# Patient Record
Sex: FEMALE | Race: WHITE
[De-identification: names, ages, dates, MRNs, and addresses within clinical notes are randomized per-mention and may not be internally consistent; named-entity substitution may affect disease eponyms.]

---

## 2017-07-14 ENCOUNTER — HOSPITAL ENCOUNTER (OUTPATIENT)
Dept: HOSPITAL 35 - RAD | Age: 82
End: 2017-07-14
Attending: FAMILY MEDICINE
Payer: COMMERCIAL

## 2017-07-14 DIAGNOSIS — Z12.31: Primary | ICD-10-CM

## 2017-11-16 ENCOUNTER — HOSPITAL ENCOUNTER (OUTPATIENT)
Dept: HOSPITAL 35 - CAT | Age: 82
End: 2017-11-16
Attending: ANESTHESIOLOGY
Payer: COMMERCIAL

## 2017-11-16 DIAGNOSIS — M47.896: Primary | ICD-10-CM

## 2017-11-16 DIAGNOSIS — M48.061: ICD-10-CM

## 2017-12-09 ENCOUNTER — HOSPITAL ENCOUNTER (INPATIENT)
Dept: HOSPITAL 35 - ER | Age: 82
LOS: 1 days | Discharge: HOME | DRG: 552 | End: 2017-12-10
Attending: INTERNAL MEDICINE | Admitting: INTERNAL MEDICINE
Payer: COMMERCIAL

## 2017-12-09 VITALS — SYSTOLIC BLOOD PRESSURE: 121 MMHG | DIASTOLIC BLOOD PRESSURE: 54 MMHG

## 2017-12-09 VITALS — SYSTOLIC BLOOD PRESSURE: 122 MMHG | DIASTOLIC BLOOD PRESSURE: 59 MMHG

## 2017-12-09 VITALS — SYSTOLIC BLOOD PRESSURE: 160 MMHG | DIASTOLIC BLOOD PRESSURE: 107 MMHG

## 2017-12-09 VITALS — HEIGHT: 65 IN | WEIGHT: 260 LBS | BODY MASS INDEX: 43.32 KG/M2

## 2017-12-09 DIAGNOSIS — D72.829: ICD-10-CM

## 2017-12-09 DIAGNOSIS — Z88.2: ICD-10-CM

## 2017-12-09 DIAGNOSIS — M43.16: ICD-10-CM
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
## 2017-12-09 DIAGNOSIS — S32.029A: Primary | ICD-10-CM

## 2017-12-09 DIAGNOSIS — E66.9: ICD-10-CM
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
## 2017-12-09 DIAGNOSIS — I48.91: ICD-10-CM
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
## 2017-12-09 LAB
ANION GAP SERPL CALC-SCNC: 6 MMOL/L (ref 7–16)
BASOPHILS NFR BLD AUTO: 0 % (ref 0–2)
BILIRUB UR-MCNC: NEGATIVE MG/DL
BUN SERPL-MCNC: 14 MG/DL (ref 7–18)
CALCIUM SERPL-MCNC: 9.8 MG/DL (ref 8.5–10.1)
CHLORIDE SERPL-SCNC: 106 MMOL/L (ref 98–107)
CO2 SERPL-SCNC: 25 MMOL/L (ref 21–32)
COLOR UR: YELLOW
CREAT SERPL-MCNC: 0.7 MG/DL (ref 0.6–1)
EOSINOPHIL NFR BLD: 0 % (ref 0–3)
ERYTHROCYTE [DISTWIDTH] IN BLOOD BY AUTOMATED COUNT: 14 % (ref 10.5–14.5)
GLUCOSE SERPL-MCNC: 109 MG/DL (ref 74–106)
GRANULOCYTES NFR BLD MANUAL: 80 % (ref 36–66)
HCT VFR BLD CALC: 51.2 % (ref 37–47)
HGB BLD-MCNC: 17.3 GM/DL (ref 12–15)
KETONES UR STRIP-MCNC: (no result) MG/DL
LYMPHOCYTES NFR BLD AUTO: 15 % (ref 24–44)
MANUAL DIFFERENTIAL PERFORMED BLD QL: YES
MCH RBC QN AUTO: 32.2 PG (ref 26–34)
MCHC RBC AUTO-ENTMCNC: 33.7 G/DL (ref 28–37)
MCV RBC: 95.6 FL (ref 80–100)
MONOCYTES NFR BLD: 5 % (ref 1–8)
NEUTROPHILS # BLD: 12.3 THOU/UL (ref 1.4–8.2)
NEUTS BAND NFR BLD: 0 % (ref 0–8)
NITRITE UR QL STRIP: NEGATIVE
PLATELET # BLD: 180 THOU/UL (ref 150–400)
POTASSIUM SERPL-SCNC: 4.6 MMOL/L (ref 3.5–5.1)
RBC # BLD AUTO: 5.36 MIL/UL (ref 4.2–5)
RBC # UR STRIP: NEGATIVE /UL
RBC MORPH BLD: NORMAL
SODIUM SERPL-SCNC: 137 MMOL/L (ref 136–145)
SP GR UR STRIP: 1.02 (ref 1–1.03)
TOTAL CELL COUNT: 100
URINE GLUCOSE-RANDOM*: NEGATIVE
URINE PROTEIN (DIPSTICK): NEGATIVE
UROBILINOGEN UR STRIP-ACNC: 0.2 E.U./DL (ref 0.2–1)
WBC # BLD AUTO: 15.4 THOU/UL (ref 4–11)

## 2017-12-09 PROCEDURE — 10790: CPT

## 2017-12-10 VITALS — SYSTOLIC BLOOD PRESSURE: 128 MMHG | DIASTOLIC BLOOD PRESSURE: 71 MMHG

## 2017-12-10 VITALS — DIASTOLIC BLOOD PRESSURE: 76 MMHG | SYSTOLIC BLOOD PRESSURE: 134 MMHG

## 2017-12-19 ENCOUNTER — HOSPITAL ENCOUNTER (OUTPATIENT)
Dept: HOSPITAL 35 - PAIN | Age: 82
Discharge: HOME | End: 2017-12-19
Attending: ANESTHESIOLOGY
Payer: COMMERCIAL

## 2017-12-19 VITALS — WEIGHT: 260 LBS | HEIGHT: 65 IN | BODY MASS INDEX: 43.32 KG/M2

## 2017-12-19 VITALS — SYSTOLIC BLOOD PRESSURE: 110 MMHG | DIASTOLIC BLOOD PRESSURE: 51 MMHG

## 2017-12-19 DIAGNOSIS — M12.88: ICD-10-CM

## 2017-12-19 DIAGNOSIS — G89.29: ICD-10-CM

## 2017-12-19 DIAGNOSIS — Z79.899: ICD-10-CM

## 2017-12-19 DIAGNOSIS — M48.061: ICD-10-CM

## 2017-12-19 DIAGNOSIS — M47.27: Primary | ICD-10-CM

## 2017-12-19 DIAGNOSIS — Z98.890: ICD-10-CM

## 2017-12-19 DIAGNOSIS — Z88.2: ICD-10-CM

## 2017-12-19 DIAGNOSIS — Z79.891: ICD-10-CM

## 2018-01-29 ENCOUNTER — HOSPITAL ENCOUNTER (OUTPATIENT)
Dept: HOSPITAL 35 - GI | Age: 83
Discharge: HOME | End: 2018-01-29
Attending: SPECIALIST
Payer: COMMERCIAL

## 2018-01-29 VITALS — WEIGHT: 255.01 LBS | BODY MASS INDEX: 42.49 KG/M2 | HEIGHT: 65 IN

## 2018-01-29 DIAGNOSIS — D12.3: ICD-10-CM

## 2018-01-29 DIAGNOSIS — Z86.010: ICD-10-CM

## 2018-01-29 DIAGNOSIS — K57.30: ICD-10-CM

## 2018-01-29 DIAGNOSIS — K64.8: ICD-10-CM

## 2018-01-29 DIAGNOSIS — D12.0: Primary | ICD-10-CM

## 2018-01-29 DIAGNOSIS — G47.33: ICD-10-CM

## 2018-01-29 DIAGNOSIS — Z79.899: ICD-10-CM

## 2018-01-29 DIAGNOSIS — Z95.0: ICD-10-CM

## 2018-01-29 DIAGNOSIS — Z90.49: ICD-10-CM

## 2018-01-29 DIAGNOSIS — Z88.2: ICD-10-CM

## 2018-01-29 PROCEDURE — 62900: CPT

## 2018-01-29 PROCEDURE — 62110: CPT

## 2018-05-31 ENCOUNTER — HOSPITAL ENCOUNTER (EMERGENCY)
Dept: HOSPITAL 35 - ER | Age: 83
Discharge: HOME | End: 2018-05-31
Payer: COMMERCIAL

## 2018-05-31 VITALS — SYSTOLIC BLOOD PRESSURE: 127 MMHG | DIASTOLIC BLOOD PRESSURE: 66 MMHG

## 2018-05-31 VITALS — HEIGHT: 66 IN | WEIGHT: 255.01 LBS | BODY MASS INDEX: 40.98 KG/M2

## 2018-05-31 DIAGNOSIS — G47.30: ICD-10-CM

## 2018-05-31 DIAGNOSIS — Z95.0: ICD-10-CM

## 2018-05-31 DIAGNOSIS — S41.112A: ICD-10-CM

## 2018-05-31 DIAGNOSIS — Z90.49: ICD-10-CM

## 2018-05-31 DIAGNOSIS — I48.91: ICD-10-CM
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
## 2018-05-31 DIAGNOSIS — Z88.2: ICD-10-CM

## 2018-05-31 DIAGNOSIS — S43.005A: Primary | ICD-10-CM
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
## 2018-05-31 LAB
ANION GAP SERPL CALC-SCNC: 7 MMOL/L (ref 7–16)
BASOPHILS NFR BLD AUTO: 0.4 % (ref 0–2)
BUN SERPL-MCNC: 13 MG/DL (ref 7–18)
CALCIUM SERPL-MCNC: 9.8 MG/DL (ref 8.5–10.1)
CHLORIDE SERPL-SCNC: 105 MMOL/L (ref 98–107)
CO2 SERPL-SCNC: 29 MMOL/L (ref 21–32)
CREAT SERPL-MCNC: 0.8 MG/DL (ref 0.6–1)
EOSINOPHIL NFR BLD: 0.3 % (ref 0–3)
ERYTHROCYTE [DISTWIDTH] IN BLOOD BY AUTOMATED COUNT: 13.3 % (ref 10.5–14.5)
GLUCOSE SERPL-MCNC: 138 MG/DL (ref 74–106)
GRANULOCYTES NFR BLD MANUAL: 86.9 % (ref 36–66)
HCT VFR BLD CALC: 45.8 % (ref 37–47)
HGB BLD-MCNC: 15.5 GM/DL (ref 12–15)
LYMPHOCYTES NFR BLD AUTO: 9 % (ref 24–44)
MCH RBC QN AUTO: 32.1 PG (ref 26–34)
MCHC RBC AUTO-ENTMCNC: 33.8 G/DL (ref 28–37)
MCV RBC: 95.1 FL (ref 80–100)
MONOCYTES NFR BLD: 3.4 % (ref 1–8)
NEUTROPHILS # BLD: 10.4 THOU/UL (ref 1.4–8.2)
PLATELET # BLD: 166 THOU/UL (ref 150–400)
POTASSIUM SERPL-SCNC: 4.1 MMOL/L (ref 3.5–5.1)
RBC # BLD AUTO: 4.81 MIL/UL (ref 4.2–5)
SODIUM SERPL-SCNC: 141 MMOL/L (ref 136–145)
WBC # BLD AUTO: 12 THOU/UL (ref 4–11)

## 2018-05-31 PROCEDURE — 62900: CPT

## 2018-05-31 PROCEDURE — 62110: CPT

## 2018-07-09 ENCOUNTER — HOSPITAL ENCOUNTER (OUTPATIENT)
Dept: HOSPITAL 35 - RAD | Age: 83
Discharge: HOME | End: 2018-07-09
Attending: ORTHOPAEDIC SURGERY
Payer: COMMERCIAL

## 2018-07-09 DIAGNOSIS — M19.012: Primary | ICD-10-CM

## 2018-07-09 DIAGNOSIS — Z79.899: ICD-10-CM

## 2018-07-09 DIAGNOSIS — M25.512: ICD-10-CM

## 2018-07-09 DIAGNOSIS — Z88.2: ICD-10-CM

## 2018-07-09 DIAGNOSIS — Z98.890: ICD-10-CM

## 2018-07-18 ENCOUNTER — HOSPITAL ENCOUNTER (OUTPATIENT)
Dept: HOSPITAL 35 - RAD | Age: 83
End: 2018-07-18
Attending: FAMILY MEDICINE
Payer: COMMERCIAL

## 2018-07-18 DIAGNOSIS — Z12.31: Primary | ICD-10-CM

## 2019-04-04 ENCOUNTER — HOSPITAL ENCOUNTER (OUTPATIENT)
Dept: HOSPITAL 35 - CV | Age: 84
End: 2019-04-04
Attending: INTERNAL MEDICINE
Payer: COMMERCIAL

## 2019-04-04 DIAGNOSIS — I48.91: ICD-10-CM

## 2019-04-04 DIAGNOSIS — I08.1: Primary | ICD-10-CM

## 2019-04-04 DIAGNOSIS — Z95.0: ICD-10-CM

## 2019-04-04 DIAGNOSIS — I45.9: ICD-10-CM

## 2019-07-24 ENCOUNTER — HOSPITAL ENCOUNTER (OUTPATIENT)
Dept: HOSPITAL 35 - RAD | Age: 84
End: 2019-07-24
Attending: FAMILY MEDICINE
Payer: COMMERCIAL

## 2019-07-24 DIAGNOSIS — Z12.31: Primary | ICD-10-CM

## 2019-09-30 ENCOUNTER — HOSPITAL ENCOUNTER (EMERGENCY)
Dept: HOSPITAL 35 - ER | Age: 84
Discharge: HOME | End: 2019-09-30
Payer: COMMERCIAL

## 2019-09-30 VITALS — BODY MASS INDEX: 39.05 KG/M2 | HEIGHT: 66 IN | WEIGHT: 242.99 LBS

## 2019-09-30 VITALS — SYSTOLIC BLOOD PRESSURE: 112 MMHG | DIASTOLIC BLOOD PRESSURE: 61 MMHG
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
## 2019-09-30 DIAGNOSIS — G47.30: ICD-10-CM

## 2019-09-30 DIAGNOSIS — Z95.0: ICD-10-CM

## 2019-09-30 DIAGNOSIS — S30.0XXA: ICD-10-CM

## 2019-09-30 DIAGNOSIS — S80.812A: ICD-10-CM
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
## 2019-09-30 DIAGNOSIS — Z88.2: ICD-10-CM

## 2019-09-30 DIAGNOSIS — S50.01XA: Primary | ICD-10-CM

## 2019-09-30 DIAGNOSIS — S00.03XA: ICD-10-CM

## 2019-09-30 DIAGNOSIS — I48.91: ICD-10-CM
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
## 2019-09-30 DIAGNOSIS — Z90.49: ICD-10-CM

## 2020-02-25 ENCOUNTER — HOSPITAL ENCOUNTER (OUTPATIENT)
Dept: HOSPITAL 35 - CAT | Age: 85
End: 2020-02-25
Attending: OTOLARYNGOLOGY
Payer: COMMERCIAL

## 2020-02-25 DIAGNOSIS — J34.89: Primary | ICD-10-CM

## 2020-02-25 DIAGNOSIS — J32.9: ICD-10-CM

## 2020-06-07 ENCOUNTER — HOSPITAL ENCOUNTER (INPATIENT)
Dept: HOSPITAL 35 - ER | Age: 85
LOS: 3 days | Discharge: HOME HEALTH | DRG: 552 | End: 2020-06-10
Payer: COMMERCIAL

## 2020-06-07 VITALS — WEIGHT: 240 LBS | HEIGHT: 65.98 IN | BODY MASS INDEX: 38.57 KG/M2

## 2020-06-07 VITALS — DIASTOLIC BLOOD PRESSURE: 50 MMHG | SYSTOLIC BLOOD PRESSURE: 131 MMHG

## 2020-06-07 VITALS — SYSTOLIC BLOOD PRESSURE: 131 MMHG | DIASTOLIC BLOOD PRESSURE: 50 MMHG

## 2020-06-07 VITALS — SYSTOLIC BLOOD PRESSURE: 120 MMHG | DIASTOLIC BLOOD PRESSURE: 46 MMHG

## 2020-06-07 VITALS — DIASTOLIC BLOOD PRESSURE: 56 MMHG | SYSTOLIC BLOOD PRESSURE: 106 MMHG
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
## 2020-06-07 DIAGNOSIS — I48.91: ICD-10-CM

## 2020-06-07 DIAGNOSIS — Z79.01: ICD-10-CM
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
## 2020-06-07 DIAGNOSIS — E03.9: ICD-10-CM

## 2020-06-07 DIAGNOSIS — M48.061: ICD-10-CM
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
## 2020-06-07 DIAGNOSIS — Z88.2: ICD-10-CM

## 2020-06-07 DIAGNOSIS — Z95.0: ICD-10-CM

## 2020-06-07 DIAGNOSIS — Z79.899: ICD-10-CM

## 2020-06-07 DIAGNOSIS — S22.089A: Primary | ICD-10-CM

## 2020-06-07 DIAGNOSIS — K59.00: ICD-10-CM

## 2020-06-07 DIAGNOSIS — M47.815: ICD-10-CM

## 2020-06-07 DIAGNOSIS — G62.9: ICD-10-CM

## 2020-06-07 DIAGNOSIS — Z90.49: ICD-10-CM

## 2020-06-07 DIAGNOSIS — G47.33: ICD-10-CM

## 2020-06-07 LAB
ANION GAP SERPL CALC-SCNC: 7 MMOL/L (ref 7–16)
APTT BLD: 44.3 SECONDS (ref 24.5–32.8)
BUN SERPL-MCNC: 15 MG/DL (ref 7–18)
CALCIUM SERPL-MCNC: 9.8 MG/DL (ref 8.5–10.1)
CHLORIDE SERPL-SCNC: 99 MMOL/L (ref 98–107)
CO2 SERPL-SCNC: 31 MMOL/L (ref 21–32)
CREAT SERPL-MCNC: 0.9 MG/DL (ref 0.6–1)
ERYTHROCYTE [DISTWIDTH] IN BLOOD BY AUTOMATED COUNT: 13.2 % (ref 10.5–14.5)
GLUCOSE SERPL-MCNC: 119 MG/DL (ref 74–106)
HCT VFR BLD CALC: 45.3 % (ref 37–47)
HGB BLD-MCNC: 15.6 GM/DL (ref 12–15)
INR PPP: 1.2
MCH RBC QN AUTO: 32.8 PG (ref 26–34)
MCHC RBC AUTO-ENTMCNC: 34.4 G/DL (ref 28–37)
MCV RBC: 95.3 FL (ref 80–100)
PLATELET # BLD: 196 THOU/UL (ref 150–400)
POTASSIUM SERPL-SCNC: 4.5 MMOL/L (ref 3.5–5.1)
PROTHROMBIN TIME: 11.9 SECONDS (ref 9.3–11.4)
RBC # BLD AUTO: 4.76 MIL/UL (ref 4.2–5)
SODIUM SERPL-SCNC: 137 MMOL/L (ref 136–145)
WBC # BLD AUTO: 10.7 THOU/UL (ref 4–11)

## 2020-06-07 PROCEDURE — 5A09357 ASSISTANCE WITH RESPIRATORY VENTILATION, LESS THAN 24 CONSECUTIVE HOURS, CONTINUOUS POSITIVE AIRWAY PRESSURE: ICD-10-PCS

## 2020-06-07 PROCEDURE — 10195: CPT

## 2020-06-07 NOTE — NUR
PT CARE ASSUMED AT 0700. A&OX4. PT ON BEDREST PER MD ORDERS DUE TO T12
FRACTURE. PT IS TO STAY ON BEDREST UNTIL BACKBRACE IS ON AFTER HEELROOM COMES
TO HER AND MEASURES HER. PAIN IS BEING MEASURED WITH PAIN MEDICATION. PT CAN
HAVE THE HOB ELEVATED TO 20%. EXTERNAL CLEMENTE IN PLACE. CONSULTS CALLED. PT
WEARS HEARING AIDS AND DENTURES. PACEMAKER. XRAYS COMPLEETED AWAITING RESULTS.
WEARS CPAP AT HOME. FALL PROTOCOL IN PLACE. ADMISSION COMPLEETED. CALL LIGHT
IN REACH.

## 2020-06-08 VITALS — SYSTOLIC BLOOD PRESSURE: 109 MMHG | DIASTOLIC BLOOD PRESSURE: 62 MMHG

## 2020-06-08 VITALS — DIASTOLIC BLOOD PRESSURE: 59 MMHG | SYSTOLIC BLOOD PRESSURE: 124 MMHG

## 2020-06-08 VITALS — DIASTOLIC BLOOD PRESSURE: 66 MMHG | SYSTOLIC BLOOD PRESSURE: 118 MMHG

## 2020-06-08 VITALS — DIASTOLIC BLOOD PRESSURE: 52 MMHG | SYSTOLIC BLOOD PRESSURE: 129 MMHG

## 2020-06-08 PROCEDURE — 5A09357 ASSISTANCE WITH RESPIRATORY VENTILATION, LESS THAN 24 CONSECUTIVE HOURS, CONTINUOUS POSITIVE AIRWAY PRESSURE: ICD-10-PCS

## 2020-06-08 NOTE — NUR
ASSUMED CARE OF THE PT AT 0700. PT IS ON STRUCT BEDREST AND CANNOT ELEVATE HOB
>20 DEGREES. BACK BRACE DELIVERED. L FOREARM DRY AND INTACT. EXTERNAL CATHETER
IN PLACE, PT IS INCONTINENT. PT USES CPAP AT NITE WITH PULSE OX. PT HAS
PACEMAKER. FALL PRECAUTIONS IN PLACE, BED IN THE LOWEST POSITION AND BED/CHAIR
ALARM ON, CALL LIGHT IS WITHIN REACH. WILL CONTINUE TO MONITOR THE PT.

## 2020-06-08 NOTE — NUR
ASSUMED PT CARE AT 1900. PT RESTING IN BED. RT BROUGHT CPAP AND HOOKED HER UP.
NO C/O PAIN OVERNIGHT - SAYS IT IS ONLY WHEN SHE MOVES. ABRASIONS ON ELBOWS
GIVEN A FRESH BANDAID. FEMALE EXTERNAL CATHETER IN PLACE. NO COMPLAINTS AT
THIS TIME. WILL CONTINUE TO MONITOR.

## 2020-06-09 VITALS — SYSTOLIC BLOOD PRESSURE: 140 MMHG | DIASTOLIC BLOOD PRESSURE: 62 MMHG

## 2020-06-09 VITALS — DIASTOLIC BLOOD PRESSURE: 59 MMHG | SYSTOLIC BLOOD PRESSURE: 155 MMHG

## 2020-06-09 VITALS — SYSTOLIC BLOOD PRESSURE: 146 MMHG | DIASTOLIC BLOOD PRESSURE: 74 MMHG

## 2020-06-09 VITALS — SYSTOLIC BLOOD PRESSURE: 126 MMHG | DIASTOLIC BLOOD PRESSURE: 64 MMHG

## 2020-06-09 NOTE — NUR
ASSESSMENT: CM REVIEWED CHART AND ATTEMPTED TO CALL PT AT BEDSIDE BUT NO
ANSWER. CM REACHED OUT TO PATIENTS DAUGHTER/DPOA RANDAL WHO STATED THAT PATIENT
LIVES AT HOME ALONE. SHE REPORTS THAT HER NIECE AIDE IS FLYING IN FROM
[facility] TOMORROW TO HELP ASSIST PATIENT AT DISCHARGE IF NEEDED. SHE REPORTS
THAT PATIENTS HAS TWO STEPS TO enter the home NO STEPS TO GET TO HER BEDROOM.
PT NORMALLY AMBULATES INDEPENDENTLY BUT DOES HAVE A CANE SHE USES WHEN OUTSIDE
OF THE HOME. PT HAS A GRAB BAR IN THE SHOWER. PT HAS NOT BEEN TO SNF. CM
DISCUSSED ROLE. PT HAS HER BRACE AND WORKING WITH PT/OT. CONSULT WAS PLACED
FOR [facility] AND CM IS AWAITING INPUT FROM [facility] AS BED AVAILABILTY IS TIGHT. SHE
STATES SHE WILL UPDATE CM AS INFORMATION IS AVAILABLE. DAUGHTER STATED HER
SISTER IS NERVOUS ABOUT PT CONSIDERING A SNF DUE TO THE COVID 19 VIRUS. CM
DISCUSSED HH OPTIONS AS WELL IF THEY ARE REFUSING SNF. CM WILL AWAIT INPUT
FROM [facility] AND CONTINUE TO FOLLOW TO ASSIST AS NEEDED.

## 2020-06-09 NOTE — NUR
PT CARE ASSUMED AT 0700. A&Ox4. PT UP IN THE RECLINER NOW THAT SHE HAS HER
BRACE. WHEN SHE IS NOT WEARING IT SHE NEEDS TO LAY FLAT IN BED WITH NO MORE
THEN A 20 DEGREE HOB ELEVATION. EXTERNAL CLEMENTE IN PLACE. CPAP AT NIGHT. PT
WILL EITHER GO TO REHAB OR HOME WITH HOME HEALTH. IV IS PATENT WITH NO REDNESS
OR EDEMA, SALINE LOCKED. PT IS STRUGGLING TO HAVE A BM AND IS REQUESTING A
LAXATIVE. PT NOW HAS PO PAIN MEDICATION AND HAS STATED THAT SHE IS DOING MUCH
BETTER HAVING A BRACE ON. FAMILY HAS BEEN UPDATED. CALL LIGHT IN REACH. FALL
PROTOCOL IN PLACE. WILL CONTINUE TO MONITOR.

## 2020-06-09 NOTE — NUR
ASSESSED AT START OF SHIFT 1900, PT RESTING IN BED HOB 20 DEGREES ON A STRICT
BED REST. ORTHO BACK BRACE AT BEDSIDE PHYSICAL THERAPY TO EVALUATE TOMORROW.
C/O PAIN 8/10 MANAGED WITH IV MORPHINE. EXT FEMALE CATH IN PLACE AND PT WEARS
CPAP AT NIGHT.  PT GRAND DTR AIDE CALLED WORRIED THAT NO ONE HAS UPDATED
HER ON CARE AND STATED THAT  HASN'T SEE HER. UPDATED HER ON CARE AND
INFORMED ABOUT PROGRESS AND CARE GIVEN DURING THE DAY. FALL PREC IN PLACE AND
CALL LIGHT IN REACH PT SLEPT THE REST OF THE NIGHT WILL CONT WITH POC TILL
EOS.

## 2020-06-10 VITALS — DIASTOLIC BLOOD PRESSURE: 63 MMHG | SYSTOLIC BLOOD PRESSURE: 114 MMHG

## 2020-06-10 VITALS — SYSTOLIC BLOOD PRESSURE: 114 MMHG | DIASTOLIC BLOOD PRESSURE: 63 MMHG

## 2020-06-10 VITALS — SYSTOLIC BLOOD PRESSURE: 121 MMHG | DIASTOLIC BLOOD PRESSURE: 47 MMHG

## 2020-06-10 NOTE — NUR
PT CARE ASSUMED AT 0700. A&0x4. PT UP IN THE RECLINER WITH BACKBRACE ON.
EXTERNAL CLEMENTE IN PLACE. CPAP OVER NIGHT. BM TODAY. PT WILL DISCHARGE HOME
WITH HOMEHEALTH TODAY. DISCHARGE INSTRUCTIONS GIVEN AND PT UNDERSTANDS. UP
WITH WALKER. IV PATENT WITH NO REDNESS OR EDEMA. IV REMOVED. FALL PROTOCOL IN
PLACE.

## 2020-06-10 NOTE — NUR
FAXED DC ORDERS/SUMMARY TO [facility] HH SPOKE WITH PREETHI THEY RECEIVED
ORDERS AND WILL NOTIFY PT TIME OF VISITS ALSO NEEDED TO KNOW PCP NOTIFIED PREETHI
THAT IT IS DR. TIMI MONTALVO [phone].

## 2020-06-10 NOTE — NUR
ASSUMED PT CARE AT 1900. PT C/O PAIN 7/10 TONIGHT. PAIN MEDICATION GIVEN WITH
LITTLE RELIEF. UP TO TOILET WITH ASSISTANCE AND WALKER. BM TONIGHT. C/O BACK
BRACE BEING OUT OF PLACE. READJUSTED MULTIPLE TIMES UNTIL PT FINALLY GOT
COMFORTABLE. CURRENTLY RESTING IN BED WITH CPAP ON.

## 2020-06-10 NOTE — NUR
on-going assessment: CM RECEIVED A CALL FROM PHYSICAL THERAPY STATING PT DID
VERY WELL TODAY AND HE IS NOW RECOMMENDING HOME WITH HH. CM SPOKE WITH PT WHO
STATES SHE DOES NOT HAVE A PREFERENCE OF HH COMPANY. [facility] AND WAITING ON A CALL BACK. CM ALSO LEFT  FOR HER DAUGHTER
RANDAL. PATIENTS HAS A NIECE WHO IS FLYING INTO TOWN TODAY AND WILL BE ABLE TO
STAY WITH HER AT DISCHARGE.

## 2020-06-25 ENCOUNTER — HOSPITAL ENCOUNTER (OUTPATIENT)
Dept: HOSPITAL 35 - RAD | Age: 85
End: 2020-06-25
Attending: NEUROLOGICAL SURGERY
Payer: COMMERCIAL
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
## 2020-06-25 DIAGNOSIS — S22.080A: Primary | ICD-10-CM

## 2020-06-25 DIAGNOSIS — S32.029A: ICD-10-CM
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
## 2020-06-30 ENCOUNTER — HOSPITAL ENCOUNTER (OUTPATIENT)
Dept: HOSPITAL 35 - SJCVC | Age: 85
End: 2020-06-30
Attending: INTERNAL MEDICINE
Payer: COMMERCIAL

## 2020-06-30 DIAGNOSIS — R94.31: Primary | ICD-10-CM

## 2020-06-30 DIAGNOSIS — Z95.0: ICD-10-CM

## 2020-06-30 DIAGNOSIS — I48.21: ICD-10-CM

## 2020-06-30 DIAGNOSIS — I65.9: ICD-10-CM

## 2020-07-01 ENCOUNTER — HOSPITAL ENCOUNTER (OUTPATIENT)
Dept: HOSPITAL 35 - SJCVCIMAG | Age: 85
End: 2020-07-01
Attending: INTERNAL MEDICINE
Payer: COMMERCIAL

## 2020-07-01 DIAGNOSIS — R22.43: ICD-10-CM

## 2020-07-01 DIAGNOSIS — M79.605: ICD-10-CM

## 2020-07-01 DIAGNOSIS — M79.604: Primary | ICD-10-CM

## 2020-09-03 ENCOUNTER — HOSPITAL ENCOUNTER (OUTPATIENT)
Dept: HOSPITAL 35 - BC | Age: 85
End: 2020-09-03
Attending: FAMILY MEDICINE
Payer: COMMERCIAL

## 2020-09-03 DIAGNOSIS — Z12.31: Primary | ICD-10-CM

## 2020-11-11 ENCOUNTER — HOSPITAL ENCOUNTER (OUTPATIENT)
Dept: HOSPITAL 35 - PAIN | Age: 85
End: 2020-11-11
Attending: ANESTHESIOLOGY
Payer: COMMERCIAL

## 2020-11-11 VITALS — WEIGHT: 230 LBS | HEIGHT: 65.98 IN | BODY MASS INDEX: 36.96 KG/M2

## 2020-11-11 VITALS — SYSTOLIC BLOOD PRESSURE: 113 MMHG | DIASTOLIC BLOOD PRESSURE: 56 MMHG

## 2020-11-11 DIAGNOSIS — Z90.49: ICD-10-CM

## 2020-11-11 DIAGNOSIS — M48.07: ICD-10-CM

## 2020-11-11 DIAGNOSIS — G89.4: ICD-10-CM

## 2020-11-11 DIAGNOSIS — I10: ICD-10-CM

## 2020-11-11 DIAGNOSIS — M47.27: ICD-10-CM
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
## 2020-11-11 DIAGNOSIS — Z79.899: ICD-10-CM

## 2020-11-11 DIAGNOSIS — S32.008D: Primary | ICD-10-CM

## 2020-11-11 NOTE — NUR
Pain Clinic Assessment:
 
1. History of Osteoarthritis:
SPINE
   History of Rheumatoid Arthritis:
DENIES
 
2. Height: 5 ft. 6 in. 167.6 cm.
   Weight: 230.0 lb.  oz. 104.328 kg.
   Patient's BMI: 37.1
 
3. Vital Signs:
   BP: 113/56 Pulse: 62 Resp: 20
   Temp:  02 Sat: 100 ECG Mon:
 
4. Pain Intensity: 9
 
5. Fall Risk:
   Dizziness: N  Needs help standing or walking: Y
   Fallen in the last 3 months: Y
   Fall risk comments:
 
 
6. Patient on Blood Thinner: XARELTO
 
7. History of Hypertension: Y
 
8. Opioid Therapy greater than 6 weeks: N
   Opiate Contract Signed:
 
9. Risk Assessment Tool Provided:
 
10. Functional Assessment Tool:
 
11. Recreational Drug Use: Never Drug Type:
    Tobacco Use: Never Smoker Tobacco Type:
       Amount or Packs/day:  How Many Years:
    Alcohol Use: No  Frequency:  Quant:

## 2020-11-11 NOTE — HPC
[facility]
[place]   [address]                     PAIN MANAGEMENT CONSULTATION  
_______________________________________________________________________________
 
Name:       TONJA VALLE              Room #:                     REG [facility].#:      [id number]                       Account #:      [id number]  
Admission:  11/11/20    Attend Phys:    Anthony Abdi DO  
Discharge:              Date of Birth:  [birth year]  
                                                          Report #: [id number]
                                                                    [id number]   
_______________________________________________________________________________
THIS REPORT FOR:  
 
cc:  Tamia Browne MD,Anthony Reynoso MD, DO                                          ~
CC: Anthony Browne
 
DATE OF SERVICE:  11/11/2020
 
 
REFERRING PHYSICIAN:  Wilson Fried MD
 
CHIEF COMPLAINT:  Low back pain.
 
HISTORY OF PRESENT ILLNESS:  As you know, the patient is an 87-year-old female
with longstanding history of low back pain, who was treated with epidural
injections in 2017 successfully undergoing 2 injections with resolution of
symptoms.  The patient states she was doing very well until 06/2020 when she
sustained a fall, leading to compression fracture at T12 and L2.  The patient
was seen and evaluated in the hospital, advised of treatment options and chose
to undergo TLSO bracing.  The patient states she wore the brace no greater than
3 weeks as it was "irritating."  She continues to experience axial back pain
that has progressed.  She has been referred to our clinic to discuss treatment
options for axial back symptoms.  She states that the symptoms she is
experiencing began directly after a fall and has worsened.  The fall was
sustained 06/04/2020.  The patient was referred to our clinic to discuss
interventional treatments.
 
The patient reports today pain is constant and steady.  She describes the pain
as aching and sharp.  She places her daily pain score 9/10, daily average at
9/10, worst pain has been is 10/10.  The patient states that pain is exacerbated
with doing any activities, improves with pain medications.  She has been
referred to our clinic to discuss interventional treatment options.
 
PAST MEDICAL HISTORY:
1.  Vertebral compression fracture of the lumbar spine.
2.  Hypertension.
3.  Thyroid disease.
 
PAST SURGICAL HISTORY:
1.  Cholecystectomy.
2.  Sinus surgery.
 
SOCIAL HISTORY:  The patient denies tobacco, alcohol, IV or illicit drug use. 
 
 
 
[facility]
[address] [place]   [address]                     PAIN MANAGEMENT CONSULTATION  
_______________________________________________________________________________
 
Name:       TONJA VALLE              Room #:                     REG [facility]CUAL.#:      [id number]                       Account #:      [id number]  
Admission:  11/11/20    Attend Phys:    Anthony Abdi DO  
Discharge:              Date of Birth:  [birth year]  
                                                          Report #: [id number]
                                                                    [id number]   
_______________________________________________________________________________
She is a retired teacher and .  She is not working, not receiving
workmen's compensation nor is trying to obtain disability benefits.  She is
unaccompanied at today's visit.
 
REVIEW OF SYSTEMS:  Positive for weight gain, decrease in appetite, frequent
headaches, wearing corrective eyewear, hearing loss with tinnitus, heart
trouble, shortness of breath, obstructive sleep apnea requiring CPAP, changes in
bowel movements, constipation, frequent urination, nocturia, incontinence and
dribbling to urine, rashes and itching, skin color changes, hair and nail
changes, bleeding and bruising tendencies, low back pain.  All other review of
systems negative per 12-point review of systems other than those listed in
history of present illness.
 
Pain impact score 26/70, moderate interference of daily activities secondary to
pain.
 
ALLERGIES:  No reported drug allergies.
 
CURRENT MEDICATIONS:  Acetaminophen 500 mg t.i.d., cyanocobalamin 1000 mcg per
day, vitamin D3 one tab per day, pseudoephedrine 30 mg p.r.n.,
hydrochlorothiazide 25 mg per day, Vyzulta one drop each eye per day.
 
IMAGING:  CT of the thoracic spine obtained 06/07/2020 shows acute transverse
horizontally oriented fracture involving the mid anterior aspect, the superior
plate of T12.  Chronic appearing L2 compression fracture.  Multilevel thoracic
lumbar spondylosis with multiple central canal stenosis, L1 through L4, L5.
 
PQRS:  The patient has known arthritic changes of the lumbar spine, bilateral
hips and knees.  No rheumatoid arthritis.  She is placing pain score at 9/10. 
She is a fall risk, has had a fall in last 3 months, but does not utilize any
type of ambulatory device despite recommendations to do so.  She is on blood
thinners in the form of Xarelto.  She is treated for hypertension.  She is not
on chronic opioids, has a low opiate addiction potential.  Pain impact 26/70,
moderate interference of daily activities secondary to pain.
 
PHYSICAL EXAMINATION:
VITAL SIGNS:  Blood pressure 113/56, pulse 62, respiratory rate 20 and
unlabored.  The patient is 100% on room air.  Height 5 feet 6 inches tall,
weight 230 pounds, BMI calculated 37.1.
GENERAL:  Well-developed, well-nourished, well-hydrated exogenously obese
87-year-old female appearing stated age, pain is rated today 9/10.
HEENT:  Normocephalic, atraumatic.  Pupils equal, round and reactive.
NEUROLOGIC:  Speech fluent.  The patient deemed a good historian.
LUNGS:  Clear.  No wheeze, rhonchi, and no rales.
CARDIOVASCULAR:  Regular.  No appreciable gallop, no rub.
ABDOMEN:  Soft, obese, normoactive bowel sounds.
 
 
 
[address]   [address]                     PAIN MANAGEMENT CONSULTATION  
_______________________________________________________________________________
 
Name:       TONJA VALLE              Room #:                     REG ROLAN PENA#:      [id number]                       Account #:      [id number]  
Admission:  11/11/20    Attend Phys:    Anthony Abdi DO  
Discharge:              Date of Birth:  [birth year]  
                                                          Report #: [id number]
                                                                    [id number]   
_______________________________________________________________________________
EXTREMITIES:  Show no clubbing, no cyanosis, and no appreciable edema.
MUSCULOSKELETAL:  Lower extremity strength appears symmetrical, but
deconditioning noted bilaterally.  Seated straight leg raising negative.  Supine
straight leg raising negative.  Shane's test is negative.  Modified Gaenslen's
positive for axial low back pain.  Ankle clonus negative.  Babinski is negative.
 Palpatory tenderness is noted over the paraspinal musculature of lower lumbar
spine.  No spinous process tenderness.  The patient's gait appears normal for
her.  She does show slight loss of lordotic curvature in stance.
 
ASSESSMENT:
1.  Symptomatic lumbar radiculopathy.
2.  Spinal stenosis of the lumbar spine.
3.  Lumbosacral spondylosis with radiculopathy.
4.  Lumbar degeneration.
5.  History of vertebral compression fractures with suboptimal therapeutic
treatment.
6.  Chronic intractable pain.
 
PLAN:
1.  The patient has been referred to our service by her primary care physician
for evaluation and treatment for chronic axial back pain.  The patient has had a
fall in 06/2020, leading to compression fracture of T12 and a chronic
compression fracture of L2.  The patient opted not to undergo vertebral
augmentation of the T12 while in the hospital and chose to utilize the TLSO
bracing system, which was recommended to be worn for 8 weeks.  The patient
states she did not make it as long as even 3 weeks before she discarded the
device.  There has been no new imaging since that time.  The patient is noted to
have done very well with the epidural injections in the past to address lumbar
radiculopathy secondary to central canal stenosis.  It would be difficult to
rule out any vertebral compression fracture or exacerbation of her known
compression fractures without further imaging and we would recommend this before
moving forward with interventional treatments.  We did discuss today the
interventional treatments we would recommend and the following was discussed
with the patient.
 
We discussed physical therapy, stretching exercise, core strengthening and a
concerted effort at weight loss to address axial back pain issues.  We discussed
suggestions in medication management to include neuropathic pain medications and
the possibility of a low dose opioid as the patient cannot take nonsteroidal
anti-inflammatories in conjunction with Xarelto therapy.  The suggestions would
be provided to the PCP.  We discussed lumbar epidural injection under
fluoroscopic guidance for which the patient was referred to our clinic.  We also
discussed surgical options with the patient, though at this point, I would not
recommend surgical options given her age and underlying comorbidities, prior to
trying more conservative treatment.  The patient is agreeable.  After reviewing
the risks and benefits of all proposed treatment options, she chose to move
 
 
 
[place] [address]                     PAIN MANAGEMENT CONSULTATION  
_______________________________________________________________________________
 
Name:       TONJA VALLE              [facility]                     [id number]                       Account #:      [id number]  
Admission:  11/11/20    Attend Phys:    Anthony Abdi DO  
Discharge:              Date of Birth:  [birth year]  
                                                          Report #: [id number]
                                                                    [id number]   
_______________________________________________________________________________
forward with an epidural injection under fluoroscopic guidance.
2.  The patient will undergo x-ray imaging of the lumbar spine.  I wish to
further evaluate the lumbar region before considering undergoing an epidural
injection.  I am concerned about the T12 fracture and the subtherapeutic
treatment of her compression with the TLSO bracing system.  She was not in the
brace long enough to provide stabilization of the fracture or long enough for
healing.  There is a possibility this may have worsened.  I also wish to
determine whether or not a spontaneous compression fracture has occurred in the
area as literature would indicate that spontaneous fractures could occur easily
within 6 weeks of the fracture.  The fact the patient had a chronic L2
compression fracture that was not related to injury would indicate a spontaneous
compression fracture.  This in conjunction with a compression fracture from a
known injury, places her at 12-fold increase of spontaneous fracture occurring
above or below and this needs to be further evaluated.  She will be sent for
x-ray imaging today to determine if there is any concerning canal impingement by
posterior walls of any new fractures.
3.  The Patient will need to gain clearance to come off her Xarelto for 3 days
prior to an epidural injection.  This is based on ED guidelines.  She will
have to be off the medication for 3 days to undergo the procedure.  We recommend
she contact the prescribing physician, gain clearance to come off the
medication.  If she can gain clearance, we will have the patient return to
undergo the epidural injection requested.
4.  No medication changes made at today's visit except for the request to come
off the Xarelto.  She will continue all current medication therapies at present.
 If she is able to gain the authorization to come off the Xarelto, she will stop
that 3 days before our appointment next Tuesday.
5.  We wish to thank the referring physician for the opportunity to see this
patient in consultation and discuss treatment options for axial back pain
secondary to spinal stenosis.  Again, we wish to thank you for the opportunity
to see this patient in consultation.
 
 
 
 
 
 
 
 
 
 
 
 
 
 
                         
   By:                               
                   
D: 11/11/20 1716                           _____________________________________
T: 11/12/20 0834                           Anthony Abdi DO            /nt

## 2020-11-17 ENCOUNTER — HOSPITAL ENCOUNTER (OUTPATIENT)
Dept: HOSPITAL 35 - PAIN | Age: 85
Discharge: HOME | End: 2020-11-17
Attending: ANESTHESIOLOGY
Payer: COMMERCIAL

## 2020-11-17 VITALS — DIASTOLIC BLOOD PRESSURE: 66 MMHG | SYSTOLIC BLOOD PRESSURE: 133 MMHG

## 2020-11-17 VITALS — HEIGHT: 65.98 IN | BODY MASS INDEX: 38.02 KG/M2 | WEIGHT: 236.6 LBS

## 2020-11-17 DIAGNOSIS — M19.90: ICD-10-CM

## 2020-11-17 DIAGNOSIS — M48.061: ICD-10-CM

## 2020-11-17 DIAGNOSIS — M51.16: Primary | ICD-10-CM

## 2020-11-17 DIAGNOSIS — Z98.890: ICD-10-CM

## 2020-11-17 DIAGNOSIS — I10: ICD-10-CM

## 2020-11-17 DIAGNOSIS — Z79.899: ICD-10-CM

## 2020-11-17 DIAGNOSIS — M47.27: ICD-10-CM

## 2020-11-17 DIAGNOSIS — G89.29: ICD-10-CM

## 2020-11-17 NOTE — NUR
Pain Clinic Assessment:
 
1. History of Osteoarthritis:
SPINE
POSSIBLE LEFT SHOULDER
   History of Rheumatoid Arthritis:
DENIES
 
2. Height: 5 ft. 6 in. 167.6 cm.
   Weight: 236.6 lb.  oz. 107.321 kg.
   Patient's BMI: 38.2
 
3. Vital Signs:
   BP: 133/66 Pulse: 80 Resp: 18
   Temp:  02 Sat: 99 ECG Mon:
 
4. Pain Intensity: 8-9
 
5. Fall Risk:
   Dizziness: N  Needs help standing or walking: N
   Fallen in the last 3 months: N
   Fall risk comments:
 
 
6. Patient on Blood Thinner: XARELTO
 
7. History of Hypertension: Y
 
8. Opioid Therapy greater than 6 weeks: N
   Opiate Contract Signed:
 
9. Risk Assessment Tool Provided: 0-LOW RISK
 
10. Functional Assessment Tool: 26/70
 
11. Recreational Drug Use: Never Drug Type:
    Tobacco Use: Never Smoker Tobacco Type:
       Amount or Packs/day:  How Many Years:
    Alcohol Use: No  Frequency:  Quant:

## 2020-11-18 NOTE — HPC
[facility]
[place]   [address]                     PAIN MANAGEMENT CONSULTATION  
_______________________________________________________________________________
 
Name:       TONJA VALLE              Room #:                     REG [facility]..#:      [id number]                       Account #:      [id number]  
Admission:  11/17/20    Attend Phys:    Anthony Abdi DO  
Discharge:              Date of Birth:  [birth year]  
                                                          Report #: [id number]
                                                                    [id number]   
_______________________________________________________________________________
THIS REPORT FOR:  
 
cc:  Tamia Browne MD,Anthony Reynoso MD, DO                                          ~
 
DATE OF SERVICE:  11/17/2020
 
 
REFERRING PHYSICIAN:  Javier Randle MD
 
CHIEF COMPLAINT:  Low back pain. 
 
HISTORY OF PRESENT ILLNESS:  As you know, the patient is an 87-year-old female
with longstanding history of low back pain treated with epidural injections in
2017 successfully, undergoing 2 injections with resolution of symptoms.  She was
seen in consultation per the request of the referring physician on 11/11/2020 to
be evaluated for possible epidural injection.  At that time, the patient showed
compression fractures that were chronic, but given her risk of fracture of
12-fold increase due to 2 spontaneous compression fractures.  We felt it was
necessary to have the patient undergo x-ray imaging to confirm that no new
fractures had occurred.  The patient was complaining of pain in a different area
and in a different intensity.  I am pleased to indicate that there is no new
compression fractures, though it does show multilevel spondylosis that has
increased since our previous evaluation.  She is now off her anticoagulant in
preparation for a lumbar epidural injection. 
 
ALLERGIES:  No reported drug allergies. 
 
CURRENT MEDICATIONS:  Acetaminophen, cyanocobalamin, vitamin D3,
pseudoephedrine, hydrochlorothiazide, Vyzulta. 
 
SOCIAL HISTORY:  The patient denies tobacco, alcohol, IV or illicit drug use. 
She is retired, retired years ago, unaccompanied today. 
 
IMAGING:  No new imaging available. 
 
PQRS:  The patient has known arthritic changes of the lumbar spine, bilateral
hips and knees.  No rheumatoid arthritis.  The patient is placing pain today at
8-9/10, not a fall risk, has not had a fall in last 3 months.  She is on
Xarelto, but discontinued the medication in preparation for today's injection. 
She is treated for hypertension.  She is not on chronic opioids, has a low
opiate addiction potential.  Pain impact 26/70, moderate interference of daily
activities secondary to pain. 
 
PHYSICAL EXAMINATION: 
 
 
 
[facility]
[address] [place]   [address]                     PAIN MANAGEMENT CONSULTATION  
_______________________________________________________________________________
 
Name:       TONJA VALLE              Room #:                     REG [facility].#:      [id number]                       Account #:      [id number]  
Admission:  11/17/20    Attend Phys:    Anthony Abdi DO  
Discharge:              Date of Birth:  [birth year]  
                                                          Report #: [id number]
                                                                    [id number]   
_______________________________________________________________________________
VITAL SIGNS:  Blood pressure 133/66, pulse 80, respiratory rate 18 and
unlabored.  The patient is 99% on room air.  Height 5 feet 6 inches tall, weight
236.6 pounds, BMI calculated 38.2. 
GENERAL:  Well-developed, well-nourished, well-hydrated exogenously obese
87-year-old female appearing stated age, pain is rated 8-9/10. 
HEENT:  Normocephalic, atraumatic.  Pupils equal, round and reactive. 
EXTREMITIES:  Show no clubbing, no cyanosis.  No appreciable edema. 
MUSCULOSKELETAL:  Seated straight leg raising is negative.  Supine straight leg
raising is negative.  Shane's test is negative.  Modified Gaenslen's positive
for axial low back pain. 
 
ASSESSMENT: 
1.  Symptomatic lumbar radiculopathy. 
2.  Spinal stenosis of lumbar spine, progressively worsening. 
3.  Lumbosacral spondylosis with radiculopathy. 
4.  Lumbar degeneration. 
5.  History of compression fractures with suboptimal therapeutic treatment. 
6.  Chronic intractable pain. 
 
PLAN:
1.  The patient returns today in followup visit where we have reviewed x-ray
imaging from her previous evaluation.  I am pleased to advise the patient there
were no new compression fractures.  It does appear that she has had progression
of her osteoarthritic changes and disk desiccation, but no new fractures. 
2.  The patient has been established today's appointment to undergo a lumbar
epidural injection under fluoroscopic guidance.  She has been off her Xarelto
the recommended amount of time for the patient to undergo this procedure.  She
has been advised risks and benefits.  These risks include but are not
necessarily limited to bleeding, bruising, infection, worsening pain, no relief
of pain, also risk of temporary or permanent muscle weakness, temporary or
permanent nerve damage, possible paralysis and death.  The patient states
understood and wished to proceed. 
3.  No medication changes made at today's visit.  The patient will restart her
Xarelto today and continue the medication as directed.
4.  We will see the patient back in followup visit on an as needed basis for
possible next in the series of lumbar epidural injections. 
 
PROCEDURE NOTE 
 
DESCRIPTION OF PROCEDURE:  L5-S1 intralaminar epidural steroid injection under
fluoroscopic guidance.
 
This is the first procedure of the second series that the patient is undergoing.
 
 
After obtaining written consent, the patient was taken back to the fluoroscopy
 
 
 
[address]   [address]                     PAIN MANAGEMENT CONSULTATION  
_______________________________________________________________________________
 
Name:       TONJA VALLE              [facility]                     [id number]                       Account #:      [id number]  
Admission:  11/17/20    Attend Phys:    Anthony Abdi DO  
Discharge:              Date of Birth:  [birth year]  
                                                          Report #: [id number]
                                                                    [id number]   
_______________________________________________________________________________
suite, placed in a prone position with pillow under the abdomen to decrease
lumbar lordosis.  The skin overlying the lumbosacral area was then prepped and
draped in aseptic fashion.  The L5-S1 vertebral interspace was then identified
by AP fluoroscopy.  The skin and subcutaneous tissue overlying the target site
of injection was anesthetized with 3 mL 1% lidocaine. 
 
A 20-gauge, 4-1/2 inch Tuohy needle was then advanced under fluoroscopic
guidance towards the epidural space using a right parasagittal approach.  The
epidural space was identified using loss of resistance to air technique.  After
negative aspiration for heme or cerebrospinal fluid, a total of 1 mL of
Omnipaque was injected.  A lumbar epidurogram was confirmed using both AP and
lateral fluoroscopy.  After negative aspiration for heme or cerebrospinal fluid,
5 mL of a solution containing 2 mL of 40 mg per mL, 80 mg total triamcinolone
along with 3 mL of lidocaine 1% was injected in increments.  Contrast spread was
noted from posterior epidural space.  The needle was then retracted
approximately half way and needle tract flushed with 1 mL of 1% lidocaine. 
Needle was then removed.  There were no apparent sensory or motor deficits in
the lower extremity following the procedure.  A sterile bandage was placed over
the injection site. 
 
The heart rate, pulse, oximetry and blood pressure were continuously monitored
after the procedure.  There were no apparent complications.  The patient
tolerated the procedure well and was carefully escorted to the recovery room in
stable condition.  There were no apparent complications.  After meeting
discharge criteria, the patient was then discharged home.
 
 
 
 
 
 
 
 
 
 
 
 
 
 
 
 
 
 
 
  <ELECTRONICALLY SIGNED>
   By: Anthony Abdi DO          
  11/18/20     1243
D: 11/17/20 1531                           _____________________________________
T: 11/18/20 0156                           Anthony Abdi DO            /nt

## 2021-06-30 ENCOUNTER — HOSPITAL ENCOUNTER (OUTPATIENT)
Dept: HOSPITAL 35 - SJCVCIMAG | Age: 86
End: 2021-06-30
Attending: INTERNAL MEDICINE
Payer: COMMERCIAL

## 2021-06-30 DIAGNOSIS — G47.33: ICD-10-CM

## 2021-06-30 DIAGNOSIS — Z79.01: ICD-10-CM

## 2021-06-30 DIAGNOSIS — Z95.0: ICD-10-CM

## 2021-06-30 DIAGNOSIS — I07.1: Primary | ICD-10-CM

## 2021-06-30 DIAGNOSIS — Z79.899: ICD-10-CM

## 2021-06-30 DIAGNOSIS — Z82.49: ICD-10-CM

## 2021-06-30 DIAGNOSIS — I45.9: ICD-10-CM

## 2021-06-30 DIAGNOSIS — M79.89: ICD-10-CM

## 2021-06-30 DIAGNOSIS — I48.21: ICD-10-CM

## 2021-06-30 DIAGNOSIS — M79.669: ICD-10-CM

## 2021-06-30 DIAGNOSIS — Z98.890: ICD-10-CM

## 2021-07-07 ENCOUNTER — HOSPITAL ENCOUNTER (OUTPATIENT)
Dept: HOSPITAL 35 - SJCVC | Age: 86
End: 2021-07-07
Attending: INTERNAL MEDICINE
Payer: COMMERCIAL

## 2021-07-07 DIAGNOSIS — I48.21: ICD-10-CM

## 2021-07-07 DIAGNOSIS — M79.89: ICD-10-CM

## 2021-07-07 DIAGNOSIS — Z95.0: ICD-10-CM

## 2021-07-07 DIAGNOSIS — Z79.899: ICD-10-CM

## 2021-07-07 DIAGNOSIS — M79.669: Primary | ICD-10-CM

## 2021-07-07 DIAGNOSIS — G47.33: ICD-10-CM

## 2021-09-08 ENCOUNTER — HOSPITAL ENCOUNTER (OUTPATIENT)
Dept: HOSPITAL 35 - BC | Age: 86
End: 2021-09-08
Attending: FAMILY MEDICINE
Payer: COMMERCIAL

## 2021-09-08 DIAGNOSIS — Z12.31: Primary | ICD-10-CM

## 2021-09-29 ENCOUNTER — HOSPITAL ENCOUNTER (OUTPATIENT)
Dept: HOSPITAL 35 - SJCVC | Age: 86
End: 2021-09-29
Attending: INTERNAL MEDICINE
Payer: COMMERCIAL

## 2021-09-29 DIAGNOSIS — Z79.899: ICD-10-CM

## 2021-09-29 DIAGNOSIS — G47.33: ICD-10-CM

## 2021-09-29 DIAGNOSIS — I48.21: Primary | ICD-10-CM

## 2021-09-29 DIAGNOSIS — Z95.0: ICD-10-CM

## 2021-09-29 DIAGNOSIS — M79.662: ICD-10-CM

## 2021-09-29 DIAGNOSIS — M79.89: ICD-10-CM

## 2021-10-26 ENCOUNTER — HOSPITAL ENCOUNTER (EMERGENCY)
Dept: HOSPITAL 35 - ER | Age: 86
Discharge: HOME | End: 2021-10-26
Payer: COMMERCIAL

## 2021-10-26 VITALS — BODY MASS INDEX: 36.96 KG/M2 | WEIGHT: 230.01 LBS | HEIGHT: 66 IN

## 2021-10-26 VITALS — SYSTOLIC BLOOD PRESSURE: 147 MMHG | DIASTOLIC BLOOD PRESSURE: 85 MMHG

## 2021-10-26 DIAGNOSIS — Z79.1: ICD-10-CM

## 2021-10-26 DIAGNOSIS — M25.512: ICD-10-CM

## 2021-10-26 DIAGNOSIS — M25.562: Primary | ICD-10-CM

## 2021-10-26 DIAGNOSIS — Z90.49: ICD-10-CM

## 2021-10-26 DIAGNOSIS — Z79.899: ICD-10-CM
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
## 2021-10-26 DIAGNOSIS — M54.2: ICD-10-CM
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
## 2021-10-26 DIAGNOSIS — M54.50: ICD-10-CM

## 2021-10-26 DIAGNOSIS — Z79.891: ICD-10-CM
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
## 2021-10-26 DIAGNOSIS — Z98.890: ICD-10-CM
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
## 2021-10-26 LAB
ALBUMIN SERPL-MCNC: 3.2 G/DL (ref 3.4–5)
ALT SERPL-CCNC: 21 U/L (ref 30–65)
ANION GAP SERPL CALC-SCNC: 8 MMOL/L (ref 7–16)
AST SERPL-CCNC: 22 U/L (ref 15–37)
BASOPHILS NFR BLD AUTO: 1 % (ref 0–2)
BILIRUB SERPL-MCNC: 0.5 MG/DL (ref 0.2–1)
BUN SERPL-MCNC: 16 MG/DL (ref 7–18)
CALCIUM SERPL-MCNC: 9.8 MG/DL (ref 8.5–10.1)
CHLORIDE SERPL-SCNC: 103 MMOL/L (ref 98–107)
CO2 SERPL-SCNC: 30 MMOL/L (ref 21–32)
CREAT SERPL-MCNC: 0.9 MG/DL (ref 0.6–1)
EOSINOPHIL NFR BLD: 2.7 % (ref 0–3)
ERYTHROCYTE [DISTWIDTH] IN BLOOD BY AUTOMATED COUNT: 13.7 % (ref 10.5–14.5)
GLUCOSE SERPL-MCNC: 103 MG/DL (ref 74–106)
GRANULOCYTES NFR BLD MANUAL: 56.2 % (ref 36–66)
HCT VFR BLD CALC: 42.1 % (ref 37–47)
HGB BLD-MCNC: 14.6 GM/DL (ref 12–15)
LYMPHOCYTES NFR BLD AUTO: 32.5 % (ref 24–44)
MCH RBC QN AUTO: 32.4 PG (ref 26–34)
MCHC RBC AUTO-ENTMCNC: 34.6 G/DL (ref 28–37)
MCV RBC: 93.7 FL (ref 80–100)
MONOCYTES NFR BLD: 7.6 % (ref 1–8)
NEUTROPHILS # BLD: 4.2 THOU/UL (ref 1.4–8.2)
PLATELET # BLD: 209 THOU/UL (ref 150–400)
POTASSIUM SERPL-SCNC: 4 MMOL/L (ref 3.5–5.1)
PROT SERPL-MCNC: 6.9 G/DL (ref 6.4–8.2)
RBC # BLD AUTO: 4.49 MIL/UL (ref 4.2–5)
SODIUM SERPL-SCNC: 141 MMOL/L (ref 136–145)
WBC # BLD AUTO: 7.4 THOU/UL (ref 4–11)

## 2021-10-26 NOTE — EMS
[address]   [address]                     EMS Patient Care Report       
_______________________________________________________________________________
 
Name:       TONJA VALLE              Room #:                     PRE   
M.R.#:      [id number]                       Account #:      [id number]  
Admission:              Attend Phys:                          
Discharge:              Date of Birth:  [birth year]  
                                                          Report #: [id number]
                                                                    [id number]
_______________________________________________________________________________
THIS REPORT FOR:   //name//                          
 
Report Transmitted: 10/26/2021 00:27
EMS Care Summary
[place], [place]/[facility]
Incident [id number] @ 10/25/2021 23:51
 
Incident Location
[address] [place] [address]
 
Patient
TONJA VALLE
Female, 88 Years
 1933
 
Patient Address
 
Patient History
Cardiac Arrythmia,
 
Patient Allergies
No known allergies,
 
 
Chief Complaint
multiple areas of pain/fall
 
Disposition
Transported No Lights/[place]
 
Dispatch Reason
Falls
 
Transported To
[facility]
 
Narrative
pt was getting out of her chair to go to bed, stumbled and fell.  she hit her 
head on the wall when she fell.  Life Alert button summoned her neighbor to 
check on her.  we arrive to find pt supine on floor, a&o.  she denies LOC.  she 
c/o "hurting everywhere".  she later narrows her pain to back, head, L arm and 
knee.  pt stood upright and helped to cot w/ assist.  she req eval at [facility].  
transport w/o change. 
 
Initial Vitals
 
 
 
[address]   [address]                     EMS Patient Care Report       
_______________________________________________________________________________
 
Name:       TONJA VALLE              Room #:                     PRE   
M.R.#:      [id number]                       Account #:      [id number]  
Admission:              Attend Phys:                          
Discharge:              Date of Birth:  [birth year]  
                                                          Report #: [id number]
                                                                    [id number]
_______________________________________________________________________________
@00:11P: 112,R: 18,BP: 147/69,Pain: 6/10,GCS: 15,SpO2: 97,Revised Trauma: 12,
 
Assessments
@00:01MENTAL:No Abnormalities,SKIN:No Abnormalities,HEENT:Head/Face: Other,LUNG 
SOUNDS:ABDOMEN:PELVIS//GI:EXTREMITIES:Left Arm: Other,Left Leg: 
Other,PULSE:NEURO:Other, 
 
Impression
Extremity Pain
 
Procedures
@00:01 ALS Assessment Response: Unchanged
@00:06 Stretcher Response: Unchanged
 
 
Timeline
23:50,Dispatch Notified
23:51,Dispatched
23:53,En Route
23:59,On Scene
00:01,At Patient
00:01,ALS Assessment,Response: Unchanged
00:06,Stretcher,Response: Unchanged
00:11,BP: 147/69 M,PULSE: 112,RR: 18 R,SPO2: 97 Ox,ETCO2:  ,BG: ,PAIN: 6,GCS: 
15, 
00:12,Depart Scene
00:22,At Destination
00:36,Call Closed
23:50,Call Received
 
Disclaimer
v1.1     Copyright  [employer]
This EMS Care Summary contains data elements from the applicable legal record 
(which may be displayed differently). It is designed to provide pertinent 
information for the following purposes: continuity of care, clinical quality, 
and state data reporting. The complete legal record is available to ED staff 
and administrators of the receiving hospital in [facility]'s Patient Tracker. All data 
is provided "as is."

## 2021-10-26 NOTE — EKG
[place] [address]
[place]  [address]
Phone:  [phone]                    ELECTROCARDIOGRAM REPORT      
_______________________________________________________________________________
 
Name:       TONJA VALLE              [id number]     Account #:      [id number]  
Admission:  10/26/21    Attend Phys:                          
Discharge:  10/26/21    Date of Birth:  [birth year]  
                                                          Report #: [id number]
   [id number]
_______________________________________________________________________________
                         [facility] ED
                                       
Test Date:    2021-10-26               Test Time:    02:42:50
Pat Name:     TONJA VALLE              Department:   
Patient ID:   [id number]            Room:          
Gender:       F                        Technician:   urbano
:          1933               Requested By: Isreal Wong
Order Number: [id number] MD:   Zan Quiles
                                 Measurements
Intervals                              Axis          
Rate:         60                       P:            0
KS:           320                      QRS:          -86
QRSD:         138                      T:            84
QT:           461                                    
QTc:          461                                    
                           Interpretive Statements
Ventricular-paced rhythm
No further analysis attempted due to paced rhythm
Compared to ECG 10/26/2021 00:39:14
No significant changes
Electronically Signed On 10- 11:51:48 CDT by Zan Quiles
[email or web address]
 
 
 
 
 
 
 
 
 
 
 
 
 
 
 
 
 
 
 
 
 
  <ELECTRONICALLY SIGNED>
   By: Zan Quiles MD, [facility]    
  10/26/21     1151
D: 10/26/21 0242                           _____________________________________
T: 10/26/21 0242                           Zan Quiles MD, FACC      /EPI

## 2022-01-04 ENCOUNTER — HOSPITAL ENCOUNTER (OUTPATIENT)
Dept: HOSPITAL 35 - PAIN | Age: 87
End: 2022-01-04
Attending: ANESTHESIOLOGY
Payer: COMMERCIAL

## 2022-01-04 VITALS — WEIGHT: 241.8 LBS | HEIGHT: 65.98 IN | BODY MASS INDEX: 38.86 KG/M2

## 2022-01-04 VITALS — DIASTOLIC BLOOD PRESSURE: 70 MMHG | SYSTOLIC BLOOD PRESSURE: 114 MMHG

## 2022-01-04 DIAGNOSIS — M48.061: ICD-10-CM

## 2022-01-04 DIAGNOSIS — M51.16: ICD-10-CM

## 2022-01-04 DIAGNOSIS — Z79.899: ICD-10-CM

## 2022-01-04 DIAGNOSIS — G89.29: ICD-10-CM

## 2022-01-04 DIAGNOSIS — M79.661: ICD-10-CM

## 2022-01-04 DIAGNOSIS — M47.26: Primary | ICD-10-CM

## 2022-01-04 DIAGNOSIS — M79.662: ICD-10-CM

## 2022-01-04 NOTE — NUR
Pain Clinic Assessment:
 
1. History of Osteoarthritis:
SPINE
POSSIBLE LEFT SHOULDER
   History of Rheumatoid Arthritis:
DENIES
 
2. Height: 5 ft. 6 in. 167.6 cm.
   Weight: 241.8 lb.  oz. 109.680 kg.
   Patient's BMI: 39.0
 
3. Vital Signs:
   BP: 114/70 Pulse: 62 Resp: 20
   Temp:  02 Sat: 99 ECG Mon:
 
4. Pain Intensity: 6-7 W/ACTIVITY
 
5. Fall Risk:
   Dizziness: N  Needs help standing or walking: Y
   Fallen in the last 3 months: Y
   Fall risk comments:
 
 
6. Patient on Blood Thinner: XARELTO
 
7. History of Hypertension: Y
 
8. Opioid Therapy greater than 6 weeks: N
   Opiate Contract Signed:
 
9. Risk Assessment Tool Provided: 0-LOW RISK
 
10. Functional Assessment Tool: 26/70
 
11. Recreational Drug Use: Never Drug Type:
    Tobacco Use: Never Smoker Tobacco Type:
       Amount or Packs/day:  How Many Years:
    Alcohol Use: No  Frequency:  Quant:

## 2022-01-05 NOTE — HPC
[address]   [address]                     PAIN MANAGEMENT CONSULTATION  
_______________________________________________________________________________
 
Name:       TONJA VALLE              Room #:                     REG [facility] 
M..#:      [id number]                       Account #:      [id number]  
Admission:  01/04/22    Attend Phys:    Anthony Abdi DO  
Discharge:              Date of Birth:  [birth year]  
                                                          Report #: [id number]
                                                                    [id number] 
_______________________________________________________________________________
THIS REPORT FOR:  
 
cc:  Tamia Browne MD,Anthony Reynoso MD, DO                                          ~
 
cc:     Tamia Browne DO
DATE OF SERVICE: 01/04/2022
 
REFERRING PHYSICIAN:  Dr. Tamia Browne.
 
CHIEF COMPLAINT:  Low back pain, bilateral lower extremity pain.
 
HISTORY OF PRESENT ILLNESS:  As you know, the patient is a very pleasant 
88-year-old female with longstanding history of low back pain, treated with 
epidural injections successfully throughout the years.  She was last seen in our
clinic on 11/17/2020, undergoing the most recent of the epidural injections.  
She had been doing very well until just recently.  She has had recurrence of 
symptoms, for which she places pain today at 6-7/10.  She returns today 
requesting to begin the process of undergoing the next in the series of lumbar 
epidural injections.  Unfortunately, the patient continued her Xarelto, which 
precludes us from being able to provide that injection today.  She has received 
excellent benefit with previous injection up to 90% improvement in overall pain,
lasting for almost a year and a half.  She returns today in followup visit to 
discuss the next in the series of lumbar epidural injections.  The patient 
denies injury or trauma that may have led to symptom development.  There have 
been no changes in her medication management that would preclude her from 
undergoing the epidural injection, assuming she can qualm off the Xarelto in 
preparation for that procedure.
 
ALLERGIES:  No known drug allergies.
 
CURRENT MEDICATIONS:  Potassium chloride 10 mEq p.o. daily, acetaminophen 500 mg
3 times a day, Vyzulta 1 drop each eye per day, hydrochlorothiazide 25 mg per 
day, pseudoephedrine 30 mg p.o. daily, vitamin B complex 1 tab per day, 
cyanocobalamin 1000 mcg per day, Xarelto 20 mg once a day, levothyroxine 25 mcg 
per day, multivitamin 1 tab per day, Neurontin 300 mg p.o. at bedtime.
 
SOCIAL HISTORY:  The patient denies tobacco, alcohol or IV or illicit drug use. 
She is retired, retired years ago.  She is accompanied by her daughter present 
in room today.
 
IMAGING:  No new imaging available.
 
PQRS:  The patient has known arthritic changes of lumbar spine, bilateral 
shoulders, hips and knees.  No rheumatoid arthritis.  She is placing current 
 
 
 
[place] [address]                     PAIN MANAGEMENT CONSULTATION  
_______________________________________________________________________________
 
Name:       TONJA VALLE              Room #:                     REG [facility]#:      [id number]                       Account #:      [id number]  
Admission:  01/04/22    Attend Phys:    Anthony Abdi DO  
Discharge:              Date of Birth:  [birth year]  
                                                          Report #: [id number]
                                                                    [id number] 
_______________________________________________________________________________
pain score at 6-7/10.  She is a fall risk and has had falls in the last 3 
months.  She does not use any type of ambulatory device.  We have cautioned her 
to obtain a device but she has not done so.  She is on no opioids, has a low 
opioid addiction potential.  Pain impact is 26-70, mild-to-moderate interference
of daily activities secondary to pain.
 
PHYSICAL EXAMINATION:
VITAL SIGNS:  Blood pressure 114/70, pulse 62, respiratory rate 20, unlabored.  
The patient 99% on room air.  Height 5 feet 6 inches tall, weight 241.8 pounds, 
BMI calculated 39.0.
GENERAL:  Well-developed, well-nourished, well-hydrated 88-year-old female 
appearing stated age.  Pain rated today 6-7/10.
HEENT:  Normocephalic, atraumatic.  Pupils equal, round.  She is wearing a 
facemask in compliance with COVID-19 regulations and hospital policies.
EXTREMITIES:  Show no clubbing, no cyanosis and no appreciable edema.
MUSCULOSKELETAL:  Seated straight leg raising negative.  Supine straight leg 
raising remains negative.  SANTO test is negative.  Modified Gaenslen's positive
for axial low back pain.  Gait is antalgic, appears that she is favoring the 
right lower extremity over left based on gait today.  Muscle bulk and tone is 
equal and symmetrical in lower extremities, though there is noted 
deconditioning.  Strength is rated at 5/5.
 
ASSESSMENT:
1.  Symptomatic lumbar radiculopathy.
2.  Spinal stenosis of the lumbar spine.
3.  Lumbosacral spondylosis with radiculopathy.
4.  Lumbar degeneration.
5.  Chronic intractable pain.
 
PLAN:
1.  The patient returns today in followup visit to begin the process of 
undergoing a lumbar epidural injection.  Unfortunately, the patient continued 
her Xarelto, which will preclude her from undergoing the injection today.  We 
will schedule the patient back in followup visit so that she can come off the 
Xarelto for the 3 days required for her to undergo a neural axial blockade such 
as a lumbar epidural injection proposed today.  The patient was offered an 
appointment for this Friday, which would be the quickest time to be off the 
Xarelto and undergo the procedure.  She chose to make an appointment back with 
us next Tuesday.  The patient will discontinue her Xarelto on Friday in 
preparation for that injection.
2.  No further changes in medication management were made except for the 
adjustments in the Xarelto to start this Friday.  We will restart the patient's 
Xarelto, assuming no bleeding issues on Tuesday of next week.  Otherwise, no 
other medication changes made today.
 
 
 
[facility]
[address]
[place]   [address]                     PAIN MANAGEMENT CONSULTATION  
_______________________________________________________________________________
 
Name:       TONJA VALLE              [facility] #[facility]                     [facility]      [id number]                       Account #:      [id number]  
Admission:  01/04/22    Attend Phys:    Anthony Abdi DO  
Discharge:              Date of Birth:  [birth year]  
                                                          Report #: [id number]
                                                                    [id number] 
_______________________________________________________________________________
3.  We will see the patient back in followup visit next Tuesday for the 
requested lumbar epidural injection under fluoroscopic guidance.
 
 
 
 
 
 
 
 
 
 
 
 
 
 
 
 
 
 
 
 
 
 
 
 
 
 
 
 
 
 
 
 
 
 
 
 
 
 
 
 
 
 
  <ELECTRONICALLY SIGNED>
   By: Anthony Abdi DO          
  01/05/22     0944
D: 01/04/22 1252                           _____________________________________
T: 01/04/22 2026                           Anthony Abdi DO            /nt

## 2022-01-11 ENCOUNTER — HOSPITAL ENCOUNTER (OUTPATIENT)
Dept: HOSPITAL 35 - PAIN | Age: 87
Discharge: HOME | End: 2022-01-11
Attending: ANESTHESIOLOGY
Payer: COMMERCIAL

## 2022-01-11 VITALS — DIASTOLIC BLOOD PRESSURE: 64 MMHG | SYSTOLIC BLOOD PRESSURE: 146 MMHG

## 2022-01-11 VITALS — WEIGHT: 237 LBS | HEIGHT: 65.98 IN | BODY MASS INDEX: 38.09 KG/M2

## 2022-01-11 DIAGNOSIS — M47.27: ICD-10-CM

## 2022-01-11 DIAGNOSIS — M19.90: ICD-10-CM

## 2022-01-11 DIAGNOSIS — I10: ICD-10-CM

## 2022-01-11 DIAGNOSIS — M48.061: ICD-10-CM

## 2022-01-11 DIAGNOSIS — G89.29: ICD-10-CM

## 2022-01-11 DIAGNOSIS — Z98.890: ICD-10-CM

## 2022-01-11 DIAGNOSIS — M51.16: Primary | ICD-10-CM

## 2022-01-11 DIAGNOSIS — Z79.899: ICD-10-CM

## 2022-01-11 NOTE — NUR
Pain Clinic Assessment:
 
1. History of Osteoarthritis:
SPINE
POSSIBLE LEFT SHOULDER
   History of Rheumatoid Arthritis:
DENIES
 
2. Height: 5 ft. 6 in. 167.6 cm.
   Weight: 237.0 lb.  oz. 107.503 kg.
   Patient's BMI: 38.3
 
3. Vital Signs:
   BP: 146/64 Pulse: 79 Resp: 18
   Temp:  02 Sat: 98 ECG Mon:
 
4. Pain Intensity: 8
 
5. Fall Risk:
   Dizziness: N  Needs help standing or walking: N
   Fallen in the last 3 months: Y
   Fall risk comments:
 
 
6. Patient on Blood Thinner: XARELTO
 
7. History of Hypertension: Y
 
8. Opioid Therapy greater than 6 weeks: N
   Opiate Contract Signed:
 
9. Risk Assessment Tool Provided: 0-LOW RISK
 
10. Functional Assessment Tool: 26/70
 
11. Recreational Drug Use: Never Drug Type:
    Tobacco Use: Never Smoker Tobacco Type:
       Amount or Packs/day:  How Many Years:
    Alcohol Use: No  Frequency:  Quant:

## 2022-01-12 NOTE — HPC
[address]   [address]                     PAIN MANAGEMENT CONSULTATION  
_______________________________________________________________________________
 
Name:       TONJA VALLE              Room #:                     REG [facility]..#:      [id number]                       Account #:      [id number]  
Admission:  01/11/22    Attend Phys:    Anthony Abdi DO  
Discharge:              Date of Birth:  [birth year]  
                                                          Report #: [id number]
                                                                    [id number] 
_______________________________________________________________________________
THIS REPORT FOR:  
 
cc:  Tamia Browne MD,Anthony Reynoso MD, DO                                          ~
 
cc:     Tamia Browne DO
DATE OF SERVICE: 01/11/2022
 
REFERRING PHYSICIAN:  Dr. Tamia Browne.
 
CHIEF COMPLAINT:  Low back pain, bilateral lower extremity pain with 
paresthesias.
 
HISTORY OF PRESENT ILLNESS:  As you know, the patient is a very pleasant 
88-year-old female with longstanding history of chronic low back pain, bilateral
lower extremity pain with paresthesias due to multifactorial central canal 
stenosis.  She has done well with previous epidural injections, returning today 
having discontinued her Xarelto in preparation for a lumbar epidural injection. 
We saw the patient on 01/04/2022 and established today's appointment to undergo 
the procedure.  She had continued her Xarelto, which precluded her from 
undergoing the injection at that visit.  She was made today's appointment to 
address recurrent lumbar radicular pain for which she was placing pain at that 
visit 6/10; at today's visit, at 8/10.  She is denying injury or trauma that may
have led to symptom development.  There have been no changes in her medication 
management other than that discontinuation of Xarelto.
 
ALLERGIES:  No known drug allergies.
 
CURRENT MEDICATIONS:  See chart.
 
SOCIAL HISTORY:  The patient denies tobacco, alcohol or IV or illicit drug use. 
She is retired, retired years ago, accompanied by her daughter present in room 
today.
 
IMAGING:  No new imaging available.
 
PQRS:  The patient has known arthritic changes of lumbar spine, bilateral 
shoulders, hips and knees.  No rheumatoid arthritis.  Pain intensity is rated 
today at 8/10.  She is a fall risk, but has not had a fall in the last 3 months.
 She is on blood thinner in the form of Xarelto, but discontinued the medication
3 days ago in preparation for the procedure today.  She is treated for 
hypertension.  She is on no opioids, has a low opioid addiction potential based 
on assessment tool.  Pain impact today is 30/70, moderate interference of daily 
activities secondary to pain.
 
 
 
 
[address]   [address]                     PAIN MANAGEMENT CONSULTATION  
_______________________________________________________________________________
 
Name:       TONJA VALLE              Room #:                     REG ROLAN 
BECKY#:      [id number]                       Account #:      [id number]  
Admission:  01/11/22    Attend Phys:    Anthony Abdi DO  
Discharge:              Date of Birth:  [birth year]  
                                                          Report #: [id number]
                                                                    [id number] 
_______________________________________________________________________________
PHYSICAL EXAMINATION:
VITAL SIGNS:  Blood pressure 146/64, pulse is 79, respiratory rate 18 and 
unlabored.  The patient is 98% on room air.  Height 5 feet 6 inches tall, weight
237 pounds, BMI calculated 38.3.
GENERAL:  Well-developed, well-nourished, well-hydrated exogenously obese 
88-year-old female appearing stated age, pain is rated today 8/10.
HEENT:  Normocephalic, atraumatic.  She is wearing a mask in compliance with 
COVID-19 regulations and hospital policy.
EXTREMITIES:  Show no clubbing, no cyanosis.  No appreciable edema.
MUSCULOSKELETAL:  Seated straight leg raising remains negative.  Supine straight
leg raising negative.  Fabere's test is negative.  Her gait is antalgic favoring
right lower extremity today.  Muscle bulk and tone in the lower extremities 
appear symmetrical.  Deep tendon reflexes are 1+/4 at patella and Achilles.
 
ASSESSMENT:
1.  Symptomatic lumbar radiculopathy.
2.  Spinal stenosis of lumbar spine.
3.  Lumbosacral spondylosis with radiculopathy.
4.  Lumbar degeneration.
5.  Chronic intractable pain.
 
PLAN:
1.  The patient returns today in followup visit, requesting a lumbar epidural 
injection under fluoroscopic guidance.  The patient has discontinued her Xarelto
in preparation for today's procedure.  She has been off the medication for the 
past 3 days.  She has been advised the risks and benefits of a lumbar epidural 
injection.  These risks include but are not necessarily limited to bleeding, 
bruising, infection, worsening pain, no relief of pain, also risk of temporary 
or permanent muscle weakness, temporary or permanent nerve damage, possible 
paralysis, post-dural puncture, headache, and death.  The patient states 
understood and wished to proceed.
2.  No medication changes made at today's visit.  The patient will continue 
current medical therapy as prior prescribed.
3.  The patient will restart her Xarelto at typical dosing.  She will continue 
the medication as directed.
4.  We plan to see the patient back in followup visit on an as needed basis.  We
are hopeful the patient will once again see good and prolonged benefit with the 
injection provided today.
 
PROCEDURE NOTE.
 
DESCRIPTION OF PROCEDURE:  L5-S1 interlaminar epidural steroid injection under 
fluoroscopic guidance.
 
After obtaining written consent, the patient was taken back to fluoroscopy 
suite, placed in prone position with pillow under abdomen to decrease lumbar 
 
 
 
[address]   [address]                     PAIN MANAGEMENT CONSULTATION  
_______________________________________________________________________________
 
Name:       TONJA VALLE              Room #:                     REG [facility]NY VICTORIA#:      [id number]                       Account #:      [id number]  
Admission:  01/11/22    Attend Phys:    Anthony Abdi DO  
Discharge:              Date of Birth:  [birth year]  
                                                          Report #: [id number]
                                                                    [id number] 
_______________________________________________________________________________
lordosis.  Skin overlying lumbosacral area then prepped and draped in aseptic 
fashion.  The L5-S1 vertebral interspace identified by AP fluoroscopy.  Skin and
subcutaneous tissue overlying target site injection anesthetized with 3 mL of 1%
lidocaine.
 
A 20 gauge 3-1/2 inch Tuohy needle advanced under fluoroscopic guidance towards 
the epidural space using a parasagittal approach.  Epidural space identified 
using loss of resistance to air technique.  After negative aspiration for heme 
or cerebrospinal fluid, 1 mL of Omnipaque injected.  Lumbar epidurogram was 
confirmed using both AP and lateral fluoroscopy.  After negative aspiration for 
heme or cerebrospinal fluid, 5 mL solution containing 2 mL 40 mg per mL 80 mg 
total triamcinolone along with 3 mL of lidocaine 1% injected slowly.  Needle 
retracted MCFP flushed with 1 mL of 1% lidocaine and removed.  Sterile 
bandage placed over injection site.  No new motor deficits present in lower 
extremity following procedure.
 
The patient tolerated the procedure well, carefully escorted to recovery room in
stable condition.  No apparent complications.  After meeting discharge criteria,
the patient discharged home.
 
 
 
 
 
 
 
 
 
 
 
 
 
 
 
 
 
 
 
 
 
 
 
 
 
  <ELECTRONICALLY SIGNED>
   By: Anthony Abdi DO          
  01/12/22 0806
D: 01/11/22 1154                           _____________________________________
T: 01/11/22 2001                           Anthony Abdi DO            /nt
hypoactive

## 2024-05-24 NOTE — EKG
[place] [address] [email or web address]  [address]
Phone:  [phone]                    ELECTROCARDIOGRAM REPORT      
_______________________________________________________________________________
 
Name:       TONJA VALLE              Room #:                     DEP [facility]#:      [id number]     Account #:      [id number]  
Admission:  10/26/21    Attend Phys:                          
Discharge:  10/26/21    Date of Birth:  [birth year]  
                                                          Report #: [id number]
   [id number]
_______________________________________________________________________________
                         [facility] ED
                                       
Test Date:    2021-10-26               Test Time:    00:39:14
Pat Name:     TONJA VALLE              Department:   
Patient ID:   [id number]            Room:          
Gender:       F                        Technician:   ángela
:          1933               Requested By: Isreal Wong
Order Number: [id number] MD:   Zan Quiles
                                 Measurements
Intervals                              Axis          
Rate:         60                       P:            0
[place]:           166                      QRS:          -88
QRSD:         145                      T:            88
QT:           460                                    
QTc:          460                                    
                           Interpretive Statements
Ventricular-paced rhythm
No further analysis attempted due to paced rhythm
Compared to ECG 2017 12:50:24
Atrial fibrillation no longer present
Electronically Signed On 10- 7:16:32 CDT by Zan Quiles
[email or web address]
 
 
 
 
 
 
 
 
 
 
 
 
 
 
 
 
 
 
 
 
 
  <ELECTRONICALLY SIGNED>
   By: Zan Quiles MD, [facility]    
  10/26/21     0716
D: 10/26/21 0039                           _____________________________________
T: 10/26/21 0039                           Zan Quiles MD, FACC      /EPI DISPLAY PLAN FREE TEXT DISPLAY PLAN FREE TEXT DISPLAY PLAN FREE TEXT